# Patient Record
Sex: FEMALE | Race: WHITE | Employment: STUDENT | URBAN - METROPOLITAN AREA
[De-identification: names, ages, dates, MRNs, and addresses within clinical notes are randomized per-mention and may not be internally consistent; named-entity substitution may affect disease eponyms.]

---

## 2019-12-21 ENCOUNTER — NURSE TRIAGE (OUTPATIENT)
Dept: NURSING | Facility: CLINIC | Age: 34
End: 2019-12-21

## 2019-12-21 ENCOUNTER — OFFICE VISIT (OUTPATIENT)
Dept: URGENT CARE | Facility: URGENT CARE | Age: 34
End: 2019-12-21
Payer: COMMERCIAL

## 2019-12-21 VITALS
HEART RATE: 89 BPM | WEIGHT: 130 LBS | DIASTOLIC BLOOD PRESSURE: 68 MMHG | SYSTOLIC BLOOD PRESSURE: 96 MMHG | OXYGEN SATURATION: 99 % | TEMPERATURE: 98 F

## 2019-12-21 DIAGNOSIS — R31.9 URINARY TRACT INFECTION WITH HEMATURIA, SITE UNSPECIFIED: Primary | ICD-10-CM

## 2019-12-21 DIAGNOSIS — R30.0 DYSURIA: ICD-10-CM

## 2019-12-21 DIAGNOSIS — N39.0 URINARY TRACT INFECTION WITH HEMATURIA, SITE UNSPECIFIED: Primary | ICD-10-CM

## 2019-12-21 LAB
ALBUMIN UR-MCNC: 30 MG/DL
APPEARANCE UR: ABNORMAL
BACTERIA #/AREA URNS HPF: ABNORMAL /HPF
BILIRUB UR QL STRIP: NEGATIVE
COLOR UR AUTO: YELLOW
GLUCOSE UR STRIP-MCNC: NEGATIVE MG/DL
HGB UR QL STRIP: ABNORMAL
KETONES UR STRIP-MCNC: NEGATIVE MG/DL
LEUKOCYTE ESTERASE UR QL STRIP: ABNORMAL
NITRATE UR QL: NEGATIVE
NON-SQ EPI CELLS #/AREA URNS LPF: ABNORMAL /LPF
PH UR STRIP: 6.5 PH (ref 5–7)
RBC #/AREA URNS AUTO: >100 /HPF
SOURCE: ABNORMAL
SP GR UR STRIP: 1.01 (ref 1–1.03)
SPECIMEN SOURCE: NORMAL
UROBILINOGEN UR STRIP-ACNC: 0.2 EU/DL (ref 0.2–1)
WBC #/AREA URNS AUTO: >100 /HPF
WET PREP SPEC: NORMAL

## 2019-12-21 PROCEDURE — 99203 OFFICE O/P NEW LOW 30 MIN: CPT | Performed by: PHYSICIAN ASSISTANT

## 2019-12-21 PROCEDURE — 87186 SC STD MICRODIL/AGAR DIL: CPT | Performed by: PHYSICIAN ASSISTANT

## 2019-12-21 PROCEDURE — 81001 URINALYSIS AUTO W/SCOPE: CPT | Performed by: PHYSICIAN ASSISTANT

## 2019-12-21 PROCEDURE — 87210 SMEAR WET MOUNT SALINE/INK: CPT | Performed by: PHYSICIAN ASSISTANT

## 2019-12-21 PROCEDURE — 87086 URINE CULTURE/COLONY COUNT: CPT | Performed by: PHYSICIAN ASSISTANT

## 2019-12-21 PROCEDURE — 87088 URINE BACTERIA CULTURE: CPT | Performed by: PHYSICIAN ASSISTANT

## 2019-12-21 RX ORDER — SULFAMETHOXAZOLE/TRIMETHOPRIM 800-160 MG
1 TABLET ORAL 2 TIMES DAILY
Qty: 6 TABLET | Refills: 0 | Status: SHIPPED | OUTPATIENT
Start: 2019-12-21 | End: 2019-12-24

## 2019-12-21 RX ORDER — FLUCONAZOLE 150 MG/1
150 TABLET ORAL
Qty: 2 TABLET | Refills: 0 | Status: SHIPPED | OUTPATIENT
Start: 2019-12-21

## 2019-12-22 ENCOUNTER — TELEPHONE (OUTPATIENT)
Dept: URGENT CARE | Facility: URGENT CARE | Age: 34
End: 2019-12-22

## 2019-12-22 ENCOUNTER — NURSE TRIAGE (OUTPATIENT)
Dept: NURSING | Facility: CLINIC | Age: 34
End: 2019-12-22

## 2019-12-22 NOTE — TELEPHONE ENCOUNTER
Arminda has hematuria an frequency for < 24 hrs; no complicating past history   Triage protocol reviewed   advised to be seen in UC or use [facility]   Will consider options   Sol Rojas RN  FNA      Reason for Disposition    Urinating more frequently than usual (i.e., frequency)    Blood in urine (red, pink, or tea-colored)    Additional Information    Negative: Shock suspected (e.g., cold/pale/clammy skin, too weak to stand, low BP, rapid pulse)    Negative: Sounds like a life-threatening emergency to the triager    Negative: Followed a genital area injury    Negative: Followed a genital area injury (penis, scrotum)    Negative: Vaginal discharge    Negative: Pus (white, yellow) or bloody discharge from end of penis    Negative: [1] Taking antibiotic for urinary tract infection (UTI) AND [2] female    Negative: [1] Taking antibiotic for urinary tract infection (UTI) AND [2] male    Negative: [1] Discomfort (pain, burning or stinging) when passing urine AND [2] pregnant    Negative: [1] Discomfort (pain, burning or stinging) when passing urine AND [2] female    Negative: [1] Discomfort (pain, burning or stinging) when passing urine AND [2] male    Negative: Pain or itching in the vulvar area    Negative: Pain in scrotum is main symptom    Negative: Blood in the urine is main symptom    Negative: Symptoms arising from use of a urinary catheter (Rosario or Coude)    Negative: [1] Unable to urinate (or only a few drops) > 4 hours AND     [2] bladder feels very full (e.g., palpable bladder or strong urge to urinate)    Negative: [1] Decreased urination and [2] drinking very little AND [2] dehydration suspected (e.g., dark urine, no urine > 12 hours, very dry mouth, very lightheaded)    Negative: Patient sounds very sick or weak to the triager    Negative: Fever > 100.5 F (38.1 C)    Negative: Side (flank) or lower back pain present    Negative: [1] Can't control passage of urine (i.e., urinary incontinence) AND [2] new  onset (< 2 weeks) or worsening    Negative: Bad or foul-smelling urine    [1] Discomfort (pain, burning or stinging) when passing urine AND [2] postpartum < 1 month    Negative: Shock suspected (e.g., cold/pale/clammy skin, too weak to stand, low BP, rapid pulse)    Negative: Sounds like a life-threatening emergency to the triager    Negative: Followed a genital area injury    Negative: Taking antibiotic for urinary tract infection (UTI)    Negative: Pregnant    Negative: [1] Unable to urinate (or only a few drops) > 4 hours AND     [2] bladder feels very full (e.g., palpable bladder or strong urge to urinate)    Negative: Fever > 100.4 F (38.0 C)    Negative: Foul smelling vaginal discharge (i.e., lochia)    Negative: Patient sounds very sick or weak to the triager    Negative: SEVERE pain with urination  (e.g., excruciating)    Negative: Side (flank) or back pain present    Negative: Diabetes mellitus or weak immune system (e.g., HIV positive, cancer chemotherapy, transplant patient)    Negative: Artificial heart valve or artificial joint    Protocols used: URINARY SYMPTOMS-A-AH, POSTPARTUM - URINATION PAIN-A-AH

## 2019-12-22 NOTE — TELEPHONE ENCOUNTER
Clinic Action Needed:Yes, Please call patient at   Reason for Call:Patient calling reporting she was advised to use back up birth control for 1 month from [facility] pharmacist due to Bactrim DS prescription.  Patient is questioning if this is true.  Placed call to [facility] who advised that they generally just tell people if it is important to them not to become pregnant to use a back up method while on the antibiotic.  Reviewed this with patient.  Patient is requesting a message to Carina HERNANDEZ to advise on back up birth control with medication and final lab results.     Ashley Dockery RN  [facility]

## 2019-12-22 NOTE — TELEPHONE ENCOUNTER
Clinic Action Needed:Yes, Please call patient at   Reason for Call:Patient calling reporting she was advised to use back up birth control for 1 month from [facility] pharmacist due to Bactrim DS prescription.  Patient is questioning if this is true.  Placed call to [facility] who advised that they generally just tell people if it is important to them not to become pregnant to use a back up method while on the antibiotic.  Reviewed this with patient.  Patient is requesting a message to Carina HERNANDEZ to advise on back up birth control with medication and final lab results.     Ashley Dockery RN  [facility]

## 2019-12-22 NOTE — PROGRESS NOTES
SUBJECTIVE:   Iliana Villegas is a 34 year old female who  presents today for a possible UTI. Symptoms of dysuria and frequency have been going on for 1day(s).  Hematuria yes .  gradual onset and still presentand mild.  There is no history of fever, chills, nausea or vomiting.  No history of vaginal  discharge. This patient does  have a history of urinary tract infections. Patient denies long duration, rigors, flank pain, temperature > 101 degrees F., Vomiting, significant nausea or diarrhea, taking Coumadin and GFR less than 30 within the last year or vaginal discharge, vaginal odor and vaginal itching   Does have mild cold sx also and pushing fluids     Generally healthy   Current Outpatient Medications   Medication Sig Dispense Refill     norethindrone-ethinyl estradiol-iron (ESTROSTEP FE) 1-20/1-30/1-35 MG-MCG TABS Take by mouth daily       Social History     Tobacco Use     Smoking status: Never Smoker     Smokeless tobacco: Never Used   Substance Use Topics     Alcohol use: Not on file       ROS:   Review of systems negative except as stated above.    OBJECTIVE:  BP 96/68   Pulse 89   Temp 98  F (36.7  C) (Tympanic)   Wt 59 kg (130 lb)   SpO2 99%   GENERAL APPEARANCE: healthy, alert and no distress  RESP: lungs clear to auscultation - no rales, rhonchi or wheezes  CV: regular rates and rhythm, normal S1 S2, no murmur noted  ABDOMEN:  soft, nontender, no HSM or masses and bowel sounds normal  BACK: No CVA tenderness  SKIN: no suspicious lesions or rashes    Results for orders placed or performed in visit on 12/21/19   UA reflex to Microscopic and Culture     Status: Abnormal   Result Value Ref Range    Color Urine Yellow     Appearance Urine Cloudy     Glucose Urine Negative NEG^Negative mg/dL    Bilirubin Urine Negative NEG^Negative    Ketones Urine Negative NEG^Negative mg/dL    Specific Gravity Urine 1.015 1.003 - 1.035    Blood Urine Large (A) NEG^Negative    pH Urine 6.5 5.0 - 7.0 pH    Protein  Albumin Urine 30 (A) NEG^Negative mg/dL    Urobilinogen Urine 0.2 0.2 - 1.0 EU/dL    Nitrite Urine Negative NEG^Negative    Leukocyte Esterase Urine Large (A) NEG^Negative    Source Midstream Urine    Urine Microscopic     Status: Abnormal   Result Value Ref Range    WBC Urine >100 (A) OTO5^0 - 5 /HPF    RBC Urine >100 (A) OTO2^O - 2 /HPF    Squamous Epithelial /LPF Urine Few FEW^Few /LPF    Bacteria Urine Many (A) NEG^Negative /HPF         ASSESSMENT:   Lower, uncomplicated urinary tract infection.    PLAN:  As per ordered above  Culture pending.  Diflucan if needed   Drink plenty of fluids.  Prevention and treatment of UTI's discussed.Signs and symptoms of pyelonephritis mentioned.  Follow up with primary care physician if not improving

## 2019-12-22 NOTE — TELEPHONE ENCOUNTER
Called and left message for patient on the cell regarding results. I advised with Hans Huntley PA-C who stated that the pharmacist is correct and pt should use back up BC while on this mediation. Advised to call back if needing anything further.     SATURNINO Anne  1:37 PM 12/22/2019

## 2019-12-23 ENCOUNTER — TELEPHONE (OUTPATIENT)
Dept: URGENT CARE | Facility: URGENT CARE | Age: 34
End: 2019-12-23

## 2019-12-23 LAB
BACTERIA SPEC CULT: ABNORMAL
SPECIMEN SOURCE: ABNORMAL

## 2019-12-23 NOTE — TELEPHONE ENCOUNTER
Provider to advise, than we will reach out to patient.     [facility] AnneK[facility] Hospital[facility]  4:13 PM 12/23/2019

## 2019-12-23 NOTE — TELEPHONE ENCOUNTER
Reason for Call:  Request for results:    Name of test or procedure: Urine    Date of test of procedure: 12/21/2019    Location of the test or procedure: Urgent Care    OK to leave the result message on voice mail or with a family member? Not Applicable    Phone number Patient can be reached at:    Telephone Information:   Mobile [phone]       Additional comments: Pt asking if results indicate she is on correct antibiotic.  Pt traveling Wednesday; please call today, if possible at [phone].    Thank you.    Call taken on 12/23/2019 at 3:50 PM by Angi Whittaker